# Patient Record
Sex: MALE | Race: WHITE
[De-identification: names, ages, dates, MRNs, and addresses within clinical notes are randomized per-mention and may not be internally consistent; named-entity substitution may affect disease eponyms.]

---

## 2017-11-21 ENCOUNTER — HOSPITAL ENCOUNTER (EMERGENCY)
Dept: HOSPITAL 10 - FTE | Age: 62
Discharge: HOME | End: 2017-11-21
Payer: MEDICARE

## 2017-11-21 VITALS — HEIGHT: 60 IN | WEIGHT: 231.93 LBS | BODY MASS INDEX: 45.53 KG/M2

## 2017-11-21 DIAGNOSIS — R51: ICD-10-CM

## 2017-11-21 DIAGNOSIS — I10: ICD-10-CM

## 2017-11-21 DIAGNOSIS — M54.2: Primary | ICD-10-CM

## 2017-11-21 DIAGNOSIS — Z79.82: ICD-10-CM

## 2017-11-21 DIAGNOSIS — M54.5: ICD-10-CM

## 2017-11-21 PROCEDURE — 72100 X-RAY EXAM L-S SPINE 2/3 VWS: CPT

## 2017-11-21 PROCEDURE — 72125 CT NECK SPINE W/O DYE: CPT

## 2017-11-21 PROCEDURE — 70450 CT HEAD/BRAIN W/O DYE: CPT

## 2017-11-21 NOTE — RADRPT
PROCEDURE:   CT Brain without contrast. 

 

CLINICAL INDICATION:   Headaches.   Neurologic deficit  

 

TECHNIQUE:   A CT of the brain was performed on multidetector high-resolution CT scanner utilizing a
xial sections from the skull base through the vertex without contrast.  One or more of the following
 dose reduction techniques were used: Automated exposure control, Adjustment of the mA and/or kV acc
ording to patient size, and/or use of iterative reconstruction technique. DICOM images are available
.

 

DOSE: CTDI = 43 mGy and the DLP = 630 mGy-cm. 

 

COMPARISON:   None available  

 

FINDINGS:

 

No acute intracranial hemorrhage, significant mass effect or midline shift. Mild hypoattenuation of 
the cerebral white matter is compatible with chronic microvascular ischemic changes. Vascular calcif
ications. Prominence of the cortical sulci and ventricles are related to mild  cerebral volume loss.
  No significant opacification of the visualized paranasal sinuses or mastoids. 

 

IMPRESSION:

 

No acute intracranial hemorrhage or mass effect.

Mild chronic microvascular disease and intracranial atherosclerosis.

 

 

RPTAT: AA

_____________________________________________ 

.Liam Tavarez MD, MD           Date    Time 

Electronically viewed and signed by .Liam Tavarez MD, MD on 11/21/2017 15:26 

 

D:  11/21/2017 15:26  T:  11/21/2017 15:26

.T/

## 2017-11-21 NOTE — RADRPT
PROCEDURE:   XR Lumbar Spine. 

 

CLINICAL INDICATION:   back pain 

 

TECHNIQUE:   AP, lateral and cone-down lateral view of the lumbar spine were obtained. 

 

COMPARISON:   No prior studies are available for comparison. 

 

FINDINGS:

 

There are severe degenerative changes of the lumbar spine at L3-L4.  There is disk space narrowing, 
subchondral sclerosis and spondylosis.

There is normal vertebral mineralization.

There is minimal dextroscoliosis of the lumbar spine.. 

No acute fracture is seen.  

There is no subluxation of vertebral bodies.

There is posterior facet arthrosis at L4-S1.

The abdominal aorta is calcified.

 

RPTAT: AA

 

IMPRESSION:

Severe degenerative changes of the lumbar spine at L3-L4.

No acute fracture noted.

Minimal dextroscoliosis of the lumbar spine.

_____________________________________________ 

.Prashant Frederick MD, MD           Date    Time 

Electronically viewed and signed by .Prashant Frederick MD, MD on 11/21/2017 14:22 

 

D:  11/21/2017 14:22  T:  11/21/2017 14:22

.S/
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
## 2018-04-18 ENCOUNTER — HOSPITAL ENCOUNTER (EMERGENCY)
Age: 63
LOS: 1 days | Discharge: HOME | End: 2018-04-19

## 2018-04-18 ENCOUNTER — HOSPITAL ENCOUNTER (EMERGENCY)
Dept: HOSPITAL 91 - FTE | Age: 63
LOS: 1 days | Discharge: HOME | End: 2018-04-19
Payer: MEDICARE
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
## 2018-04-18 DIAGNOSIS — I10: ICD-10-CM

## 2018-04-18 DIAGNOSIS — Z79.82: ICD-10-CM

## 2018-04-18 DIAGNOSIS — E11.9: ICD-10-CM

## 2018-04-18 DIAGNOSIS — Z23: ICD-10-CM
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
## 2018-04-18 DIAGNOSIS — Z79.4: ICD-10-CM

## 2018-04-18 DIAGNOSIS — S81.811A: Primary | ICD-10-CM

## 2018-04-18 PROCEDURE — 12001 RPR S/N/AX/GEN/TRNK 2.5CM/<: CPT

## 2018-04-18 PROCEDURE — 99283 EMERGENCY DEPT VISIT LOW MDM: CPT

## 2018-04-18 PROCEDURE — 90715 TDAP VACCINE 7 YRS/> IM: CPT

## 2018-04-18 PROCEDURE — 90471 IMMUNIZATION ADMIN: CPT

## 2018-04-19 RX ADMIN — CLOSTRIDIUM TETANI TOXOID ANTIGEN (FORMALDEHYDE INACTIVATED), CORYNEBACTERIUM DIPHTHERIAE TOXOID ANTIGEN (FORMALDEHYDE INACTIVATED), BORDETELLA PERTUSSIS TOXOID ANTIGEN (GLUTARALDEHYDE INACTIVATED), BORDETELLA PERTUSSIS FILAMENTOUS HEMAGGLUTININ ANTIGEN (FORMALDEHYDE INACTIVATED), BORDETELLA PERTUSSIS PERTACTIN ANTIGEN, AND BORDETELLA PERTUSSIS FIMBRIAE 2/3 ANTIGEN 1 ML: 5; 2; 2.5; 5; 3; 5 INJECTION, SUSPENSION INTRAMUSCULAR at 00:15

## 2018-04-19 RX ADMIN — ACETAMINOPHEN 1 MG: 325 TABLET, FILM COATED ORAL at 00:14
